# Patient Record
Sex: FEMALE
[De-identification: names, ages, dates, MRNs, and addresses within clinical notes are randomized per-mention and may not be internally consistent; named-entity substitution may affect disease eponyms.]

---

## 2017-05-24 ENCOUNTER — CHARTING TRANS (OUTPATIENT)
Dept: OTHER | Age: 47
End: 2017-05-24

## 2017-11-12 ENCOUNTER — HOSPITAL ENCOUNTER (OUTPATIENT)
Age: 47
Discharge: HOME | End: 2017-11-12
Attending: EMERGENCY MEDICINE
Payer: COMMERCIAL

## 2017-11-12 VITALS
HEART RATE: 66 BPM | HEIGHT: 65 IN | WEIGHT: 177 LBS | DIASTOLIC BLOOD PRESSURE: 82 MMHG | TEMPERATURE: 98 F | OXYGEN SATURATION: 100 % | SYSTOLIC BLOOD PRESSURE: 155 MMHG | RESPIRATION RATE: 12 BRPM | BODY MASS INDEX: 29.49 KG/M2

## 2017-11-12 DIAGNOSIS — S39.92XA LOWER BACK INJURY, INITIAL ENCOUNTER: Primary | ICD-10-CM

## 2017-11-12 PROCEDURE — 99203 OFFICE O/P NEW LOW 30 MIN: CPT

## 2017-11-12 PROCEDURE — 99204 OFFICE O/P NEW MOD 45 MIN: CPT

## 2017-11-12 RX ORDER — SERTRALINE HYDROCHLORIDE 100 MG/1
100 TABLET, FILM COATED ORAL DAILY
COMMUNITY

## 2017-11-12 RX ORDER — CYCLOBENZAPRINE HCL 10 MG
10 TABLET ORAL ONCE
Status: COMPLETED | OUTPATIENT
Start: 2017-11-12 | End: 2017-11-12

## 2017-11-12 RX ORDER — LAMOTRIGINE 100 MG/1
100 TABLET ORAL DAILY
COMMUNITY

## 2017-11-12 RX ORDER — IBUPROFEN 400 MG/1
800 TABLET ORAL ONCE
Status: COMPLETED | OUTPATIENT
Start: 2017-11-12 | End: 2017-11-12

## 2017-11-12 RX ORDER — NAPROXEN 500 MG/1
500 TABLET ORAL 2 TIMES DAILY PRN
Qty: 14 TABLET | Refills: 0 | Status: SHIPPED | OUTPATIENT
Start: 2017-11-12 | End: 2017-11-19

## 2017-11-12 RX ORDER — CYCLOBENZAPRINE HCL 10 MG
10 TABLET ORAL 3 TIMES DAILY PRN
Qty: 12 TABLET | Refills: 0 | Status: SHIPPED | OUTPATIENT
Start: 2017-11-12 | End: 2017-11-16

## 2017-11-12 NOTE — ED PROVIDER NOTES
[address]    History   Patient presents with:  Back Pain (musculoskeletal)    Stated Complaint: back pain    HPI    Chief complaint: Back pain    History of present illness:   The patient complains of back noted in HPI. Constitutional and vital signs reviewed. All other systems reviewed and negative except as noted above. PSFH elements reviewed from today and agreed except as otherwise stated in HPI.     Physical Exam   ED Triage Vitals [11/12/17 152 initial encounter  (primary encounter diagnosis)    Disposition:  There is no disposition on file for this visit.     Follow-up:  Elias Sanon [facility] [id number] [phone]    In 2 days  As needed, If symptoms worsen      Medications

## 2017-11-12 NOTE — ED INITIAL ASSESSMENT (HPI)
Patient comes in with left-sided lower back pain which started last night when she bent over and felt it spasm. She applied ice and has taken ibuprofen for pain relief but symptoms persist and she is in a lot of pain.

## 2017-11-12 NOTE — ED NOTES
Discharge instructions reviewed with patient. Prescriptions x2 given. All questions answered. Patient leaving facility with family member who will drive her home.

## 2018-11-03 VITALS
WEIGHT: 185 LBS | OXYGEN SATURATION: 98 % | RESPIRATION RATE: 16 BRPM | DIASTOLIC BLOOD PRESSURE: 76 MMHG | BODY MASS INDEX: 30.82 KG/M2 | SYSTOLIC BLOOD PRESSURE: 120 MMHG | HEART RATE: 63 BPM | HEIGHT: 65 IN | TEMPERATURE: 98.1 F